# Patient Record
Sex: FEMALE | Race: BLACK OR AFRICAN AMERICAN | Employment: OTHER | URBAN - METROPOLITAN AREA
[De-identification: names, ages, dates, MRNs, and addresses within clinical notes are randomized per-mention and may not be internally consistent; named-entity substitution may affect disease eponyms.]

---

## 2017-01-30 LAB — CREATININE, EXTERNAL: 0.9

## 2017-01-31 LAB
HBA1C MFR BLD HPLC: 10.1 %
LDL-C, EXTERNAL: 193

## 2017-03-04 DIAGNOSIS — E11.65 TYPE 2 DIABETES MELLITUS WITH HYPERGLYCEMIA (HCC): ICD-10-CM

## 2017-03-05 RX ORDER — BLOOD SUGAR DIAGNOSTIC
STRIP MISCELLANEOUS
Qty: 200 STRIP | Refills: 5 | Status: SHIPPED | OUTPATIENT
Start: 2017-03-05 | End: 2017-03-07 | Stop reason: SDUPTHER

## 2017-03-13 ENCOUNTER — OFFICE VISIT (OUTPATIENT)
Dept: ENDOCRINOLOGY | Age: 67
End: 2017-03-13

## 2017-03-13 VITALS
HEART RATE: 95 BPM | RESPIRATION RATE: 18 BRPM | SYSTOLIC BLOOD PRESSURE: 151 MMHG | TEMPERATURE: 96.6 F | BODY MASS INDEX: 41.79 KG/M2 | WEIGHT: 250.8 LBS | HEIGHT: 65 IN | DIASTOLIC BLOOD PRESSURE: 82 MMHG

## 2017-03-13 DIAGNOSIS — E78.2 MIXED HYPERLIPIDEMIA: ICD-10-CM

## 2017-03-13 DIAGNOSIS — I10 ESSENTIAL HYPERTENSION: ICD-10-CM

## 2017-03-13 DIAGNOSIS — E11.65 TYPE 2 DIABETES MELLITUS WITH HYPERGLYCEMIA, WITH LONG-TERM CURRENT USE OF INSULIN (HCC): Primary | ICD-10-CM

## 2017-03-13 DIAGNOSIS — Z79.4 TYPE 2 DIABETES MELLITUS WITH HYPERGLYCEMIA, WITH LONG-TERM CURRENT USE OF INSULIN (HCC): ICD-10-CM

## 2017-03-13 DIAGNOSIS — Z79.4 TYPE 2 DIABETES MELLITUS WITH HYPERGLYCEMIA, WITH LONG-TERM CURRENT USE OF INSULIN (HCC): Primary | ICD-10-CM

## 2017-03-13 DIAGNOSIS — E11.65 TYPE 2 DIABETES MELLITUS WITH HYPERGLYCEMIA, WITH LONG-TERM CURRENT USE OF INSULIN (HCC): ICD-10-CM

## 2017-03-13 NOTE — PROGRESS NOTES
Leonila Lance is a 61 y.o. female here for   Chief Complaint   Patient presents with    Diabetes    Cholesterol Problem    Hypertension       Functional glucose monitor and record keeping system? - yes  Eye exam within last year? - yes Mar 2017  Foot exam within last year? - yes dec 2016    Lab Results   Component Value Date/Time    Hemoglobin A1c 8.9 10/12/2015 10:30 AM    Hemoglobin A1c (POC) 9.0 03/11/2015 10:55 AM    Hemoglobin A1c, External 9.8 10/01/2016       Wt Readings from Last 3 Encounters:   11/03/16 256 lb 6.4 oz (116.3 kg)   10/12/15 235 lb (106.6 kg)   10/05/15 230 lb (104.3 kg)     Temp Readings from Last 3 Encounters:   11/03/16 97.4 °F (36.3 °C) (Oral)   10/12/15 97.4 °F (36.3 °C)   08/28/15 98.6 °F (37 °C)     BP Readings from Last 3 Encounters:   11/03/16 147/82   10/12/15 168/80   08/28/15 (!) 156/93     Pulse Readings from Last 3 Encounters:   11/03/16 97   10/12/15 87   08/28/15 (!) 105

## 2017-03-13 NOTE — PATIENT INSTRUCTIONS
Lantus solostar 70 units AM and 50 units 7 PM     Novolog fast acting 35 mg units breakfast ,45 units  lunch and 45 units  dinner , take 15 mins after you eat   Less than 70          Take half the dose       Additional Novolog if     Blood sugar  Breakfast/Lunch/Dinner       130-200  Add  5  Units       201-250  Add  10 Units       251-300  Add  15 Units       301-350  Add 18 Units        351-400  Add 20 Units       Check glucose before breakfast and dinner,bedtime - 3 times a day

## 2017-03-13 NOTE — MR AVS SNAPSHOT
Visit Information Date & Time Provider Department Dept. Phone Encounter #  
 3/13/2017 10:45 AM Darlene Garcia MD [facility] [phone] [id number] Follow-up Instructions Return in about 3 months (around 6/13/2017). Upcoming Health Maintenance Date Due Hepatitis C Screening 1950 FOOT EXAM Q1 7/1/1962 EYE EXAM RETINAL OR DILATED Q1 7/1/1962 Pneumococcal 19-64 Medium Risk (1 of 1 - PPSV23) 7/1/1971 DTaP/Tdap/Td series (1 - Tdap) 7/1/1973 PAP AKA CERVICAL CYTOLOGY 7/1/1973 BREAST CANCER SCRN MAMMOGRAM 7/1/2002 FOBT Q 1 YEAR AGE 50-75 7/1/2002 ZOSTER VACCINE AGE 60> 7/1/2012 INFLUENZA AGE 9 TO ADULT 8/1/2016 HEMOGLOBIN A1C Q6M 4/1/2017 MICROALBUMIN Q1 10/1/2017 LIPID PANEL Q1 10/1/2017 Allergies as of 3/13/2017  Review Complete On: 3/13/2017 By: Darlene Garcia MD  
  
 Severity Noted Reaction Type Reactions Motrin [Ibuprofen]  02/28/2013    Other (comments) Peptic ulcer Peanut  02/28/2013    Hives Sulfa (Sulfonamide Antibiotics)  04/12/2013    Rash Zithromax [Azithromycin]  02/28/2013    Hives Hives and rash Current Immunizations  Reviewed on 10/9/2015 No immunizations on file. Not reviewed this visit You Were Diagnosed With   
  
 Codes Comments Type 2 diabetes mellitus with hyperglycemia, with long-term current use of insulin (HCC)    -  Primary ICD-10-CM: E11.65, Z79.4 ICD-9-CM: 250.00, 790.29, V58.67 Mixed hyperlipidemia     ICD-10-CM: E78.2 ICD-9-CM: 272.2 Essential hypertension     ICD-10-CM: I10 
ICD-9-CM: 401.9 Vitals BP Pulse Temp Resp Height(growth percentile) Weight(growth percentile) 151/82 (BP 1 Location: Right arm, BP Patient Position: Sitting) 95 96.6 °F (35.9 °C) (Oral) 18 5' 4.75\" (1.645 m) 250 lb 12.8 oz (113.8 kg) BMI OB Status Smoking Status 42.06 kg/m2 Hysterectomy Former Smoker BMI and BSA Data Body Mass Index Body Surface Area 42.06 kg/m 2 2.28 m 2 Preferred Pharmacy Pharmacy Name Phone [facility] [address] [facility] [place], [place] Your Updated Medication List  
  
   
This list is accurate as of: 3/13/17 11:45 AM.  Always use your most recent med list.  
  
  
  
  
 aspirin 81 mg tablet Take 81 mg by mouth daily. atorvastatin 40 mg tablet Commonly known as:  LIPITOR  
  
 BD INSULIN PEN NEEDLE UF SHORT 31 gauge x 5/16\" Ndle Generic drug:  Insulin Needles (Disposable) USE TO INJECT INSULIN UP TO 6 TIMES A DAY  
  
 BREO ELLIPTA 200-25 mcg/dose inhaler Generic drug:  fluticasone-vilanterol  
  
 gabapentin 300 mg capsule Commonly known as:  NEURONTIN Take 300 mg by mouth two (2) times daily as needed. glucose blood VI test strips strip Commonly known as:  ACCU-CHEK SMARTVIEW TEST STRIP  
USE  STRIP TO TEST BLOOD GLUCOSE 4 TIMES DAILY Dx Code: E11.65  
  
 insulin aspart 100 unit/mL Inpn Commonly known as:  Claybon Delay Inject 35 units breakfast ,35 units before lunch and 35 units before dinner w/ SSI Max units daily: 165  
  
 insulin glargine 100 unit/mL (3 mL) pen Commonly known as:  LANTUS SOLOSTAR Inject 60 units AM and 60 units 7 PM  
  
 * Lancets Misc Commonly known as:  ACCU-CHEK SOFTCLIX LANCETS Use to test blood glucose 4x daily, Uncontrolled DM and frequently on steroids * Lancets Misc Commonly known as:  ACCU-CHEK FASTCLIX Use to test blood glucose 4x daily Dx Code: E11.65  
  
 LASIX 80 mg tablet Generic drug:  furosemide Take  by mouth daily. loratadine 10 mg tablet Commonly known as:  Reyes long term Take 10 mg by mouth daily. LORazepam 0.5 mg tablet Commonly known as:  ATIVAN Take 0.5 mg by mouth every eight (8) hours as needed for Anxiety. losartan 25 mg tablet Commonly known as:  COZAAR  
  
 metFORMIN 1,000 mg tablet Commonly known as:  GLUCOPHAGE  
 Take 1 Tab by mouth two (2) times daily (with meals). metoprolol succinate 25 mg XL tablet Commonly known as:  TOPROL-XL Take 1 Tab by mouth daily. nystatin 100,000 unit/mL suspension Commonly known as:  MYCOSTATIN  
  
 oxyCODONE IR 5 mg immediate release tablet Commonly known as:  Lyndamelanie Peteen Take 1-2 Tabs by mouth every three (3) hours as needed. Max Daily Amount: 80 mg. VENTOLIN HFA 90 mcg/actuation inhaler Generic drug:  albuterol Take 1 Puff by inhalation daily. VITAMIN B-12 PO Take 500 mcg by mouth daily. VITAMIN D3 1,000 unit Cap Generic drug:  cholecalciferol Take 1 Tab by mouth daily. VOLTAREN 1 % Gel Generic drug:  diclofenac * Notice: This list has 2 medication(s) that are the same as other medications prescribed for you. Read the directions carefully, and ask your doctor or other care provider to review them with you. Follow-up Instructions Return in about 3 months (around 6/13/2017). Patient Instructions Lantus solostar 70 units AM and 50 units 7 PM  
 
Novolog fast acting 35 mg units breakfast ,45 units  lunch and 45 units  dinner , take 15 mins after you eat Lantus solostar 60 units AM and 60 units 7 PM  
Less than 70          Take half the dose Additional Novolog if  
 
Blood sugar  Breakfast/Lunch/Dinner 130-200  Add  5  Units 201-250  Add  10 Units 251-300  Add  15 Units 301-350  Add 18 Units 351-400  Add 20 Units Check glucose before breakfast and dinner,bedtime - 3 times a day Introducing [facility]! Dear Patricia Teague: 
Thank you for requesting a [employer] account. Our records indicate that you already have an active [employer] account. You can access your account anytime at [email or web address] Did you know that you can access your hospital and ER discharge instructions at any time in [employer]?   You can also review all of your test results from your hospital stay or ER visit. Additional Information If you have questions, please visit the Frequently Asked Questions section of the [email or web address] website at [email or web address] Remember, [email or web address] is NOT to be used for urgent needs. For medical emergencies, dial 911. Now available from your iPhone and Android! Please provide this summary of care documentation to your next provider. Your primary care clinician is listed as [facility]. If you have any questions after today's visit, please call [phone].

## 2017-03-14 RX ORDER — INSULIN GLARGINE 100 [IU]/ML
INJECTION, SOLUTION SUBCUTANEOUS
Qty: 45 ML | Refills: 11 | Status: SHIPPED | OUTPATIENT
Start: 2017-03-14

## 2017-03-14 RX ORDER — INSULIN ASPART 100 [IU]/ML
INJECTION, SOLUTION INTRAVENOUS; SUBCUTANEOUS
Qty: 60 ML | Refills: 11 | Status: SHIPPED | OUTPATIENT
Start: 2017-03-14 | End: 2017-06-19 | Stop reason: SDUPTHER

## 2017-03-14 RX ORDER — METFORMIN HYDROCHLORIDE 1000 MG/1
1000 TABLET ORAL 2 TIMES DAILY WITH MEALS
Qty: 60 TAB | Refills: 11 | Status: SHIPPED | OUTPATIENT
Start: 2017-03-14

## 2017-03-14 RX ORDER — LANCETS
EACH MISCELLANEOUS
Qty: 200 EACH | Refills: 5 | Status: SHIPPED | OUTPATIENT
Start: 2017-03-14

## 2017-06-19 DIAGNOSIS — Z79.4 TYPE 2 DIABETES MELLITUS WITH HYPERGLYCEMIA, WITH LONG-TERM CURRENT USE OF INSULIN (HCC): ICD-10-CM

## 2017-06-19 DIAGNOSIS — E11.65 TYPE 2 DIABETES MELLITUS WITH HYPERGLYCEMIA, WITH LONG-TERM CURRENT USE OF INSULIN (HCC): ICD-10-CM

## 2017-06-19 RX ORDER — INSULIN ASPART 100 [IU]/ML
INJECTION, SOLUTION INTRAVENOUS; SUBCUTANEOUS
Qty: 165 ML | Refills: 3 | Status: SHIPPED | OUTPATIENT
Start: 2017-06-19

## 2017-11-14 LAB
CREATININE, EXTERNAL: 0.63
HBA1C MFR BLD HPLC: NORMAL %

## 2017-11-20 DIAGNOSIS — E11.9 TYPE 2 DIABETES MELLITUS WITHOUT COMPLICATION (HCC): ICD-10-CM

## 2017-11-20 RX ORDER — PEN NEEDLE, DIABETIC 31 GX5/16"
NEEDLE, DISPOSABLE MISCELLANEOUS
Qty: 200 PEN NEEDLE | Refills: 11 | Status: SHIPPED | OUTPATIENT
Start: 2017-11-20

## 2018-06-20 NOTE — PROGRESS NOTES
Health Maintenance Summary     Topic Due On Due Status Completed On Postpone Until Reason     Jul 11, 1994 Postponed  Jun 20, 2018 Patient Refused    IMMUNIZATION - DTaP/Tdap/Td Dec 15, 2027 Not Due Dec 15, 2017      Immunization-Influenza  Completed Dec 15, 2017      Depression Screening Dec 15, 2018 Not Due Dec 15, 2017            Patient is due for topics as listed above, he wishes to decline at this time .           [facility]               Terri Cohen MD        [address] 78 [phone] Fax [phone] ( [id number]) [address] [address] [phone] Fax [phone] ( Monday)          Patient Information  Date:3/13/2017  Name : Mortimer Elders 61 y.o.     YOB: 1950         Referred by:  Dr Pino Posada.    History of Present Illness: Mortimer Elders is a 61 y.o. female here for fu  of  Type 2 Diabetes Mellitus   Back pain ,seeing pain management , DJD < s/p Steroid injections      Last Tuesday she got steroid injection to the back and BG is high   She is missing AM lantus  Lately BG is in 300 - 400s post steroid injection and gets steroids frequently     Could not tolerate  Bydureon,     She reports mold in the house and then she is living with friends and family. She has been traveling a lot. She is not using CPAP consistently.       Intermittently on steroids since December 2013   Monitoring frequency:2-3 /day           Wt Readings from Last 3 Encounters:   03/13/17 250 lb 12.8 oz (113.8 kg)   11/03/16 256 lb 6.4 oz (116.3 kg)   10/12/15 235 lb (106.6 kg)       BP Readings from Last 3 Encounters:   03/13/17 151/82   11/03/16 147/82   10/12/15 168/80         Past Medical History:   Diagnosis Date    Arrhythmia     palpitations    Arthritis     Asthma     Chronic pain     lower back    COPD (chronic obstructive pulmonary disease) ([address]     Coronary-myocardial bridge     LAD non obstructive by cath 2009    DM type 2 (diabetes mellitus, type 2) ([address]     Essential hypertension     HCVD (hypertensive cardiovascular disease)     LVH     Heart failure ([address]     chf    Hyperlipidemia     Migraines     Morbid obesity ([address]     Stroke ([facility] 2009    Tic     Unspecified sleep apnea     does not tolerate cpap     Current Outpatient Prescriptions   Medication Sig    glucose blood VI test strips (ACCU-CHEK SMARTVIEW TEST STRIP) strip USE  STRIP TO TEST BLOOD GLUCOSE 4 TIMES DAILY Dx Code: E11.65    VOLTAREN 1 % gel     BREO ELLIPTA 200-25 mcg/dose inhaler     nystatin (MYCOSTATIN) 100,000 unit/mL suspension     BD INSULIN PEN NEEDLE UF SHORT 31 gauge x 5/16\" ndle USE TO INJECT INSULIN UP TO 6 TIMES A DAY    Lancets (ACCU-CHEK FASTCLIX) misc Use to test blood glucose 4x daily Dx Code: E11.65    oxyCODONE IR (ROXICODONE) 5 mg immediate release tablet Take 1-2 Tabs by mouth every three (3) hours as needed. Max Daily Amount: 80 mg.    loratadine (CLARITIN) 10 mg tablet Take 10 mg by mouth daily.  Cholecalciferol, Vitamin D3, (VITAMIN D3) 1,000 unit cap Take 1 Tab by mouth daily.  insulin glargine (LANTUS SOLOSTAR) 100 unit/mL (3 mL) pen Inject 60 units AM and 60 units 7 PM    insulin aspart (NOVOLOG) 100 unit/mL flexpen Inject 35 units breakfast ,35 units before lunch and 35 units before dinner w/ SSI Max units daily: 165    metFORMIN (GLUCOPHAGE) 1,000 mg tablet Take 1 Tab by mouth two (2) times daily (with meals).  LORazepam (ATIVAN) 0.5 mg tablet Take 0.5 mg by mouth every eight (8) hours as needed for Anxiety.  CYANOCOBALAMIN, VITAMIN B-12, (VITAMIN B-12 PO) Take 500 mcg by mouth daily.  gabapentin (NEURONTIN) 300 mg capsule Take 300 mg by mouth two (2) times daily as needed.  albuterol (VENTOLIN HFA) 90 mcg/actuation inhaler Take 1 Puff by inhalation daily.  metoprolol succinate (TOPROL-XL) 25 mg XL tablet Take 1 Tab by mouth daily.  Lancets (ACCU-CHEK SOFTCLIX LANCETS) misc Use to test blood glucose 4x daily, Uncontrolled DM and frequently on steroids    furosemide (LASIX) 80 mg tablet Take  by mouth daily.  aspirin 81 mg tablet Take 81 mg by mouth daily.  atorvastatin (LIPITOR) 40 mg tablet     losartan (COZAAR) 25 mg tablet      No current facility-administered medications for this visit.       Allergies   Allergen Reactions    Motrin [Ibuprofen] Other (comments)     Peptic ulcer    Peanut Hives    Sulfa (Sulfonamide Antibiotics) Rash    Zithromax [Azithromycin] Hives     Hives and rash         Review of Systems:  - Constitutional Symptoms: no fevers, no chills  - Eyes: no blurry vision no double vision  - Cardiovascular: no chest pain ,no palpitations  - Respiratory: no cough , shortness of breath  - Gastrointestinal: no dysphagia no  abdominal pain  - Musculoskeletal: no joint pains +  weakness  - Integumentary: no rashes  - Neurological: no numbness, tingling, no  headaches  -     Physical Examination:   Blood pressure 151/82, pulse 95, temperature 96.6 °F (35.9 °C), temperature source Oral, resp. rate 18, height 5' 4.75\" (1.645 m), weight 250 lb 12.8 oz (113.8 kg). Estimated body mass index is 42.06 kg/(m^2) as calculated from the following:    Height as of this encounter: 5' 4.75\" (1.645 m). -   Weight as of this encounter: 250 lb 12.8 oz (113.8 kg). - General: pleasant, no distress, good eye contact,obese  - HEENT: no pallor, no periorbital edema, EOMI  - Neck: supple, no thyromegaly  - Cardiovascular: regular, normal rate, normal S1 and S2  - Respiratory: clear to auscultation bilaterally  - Gastrointestinal: soft, nontender, nondistended,  BS +  - Musculoskeletal: trace edema  - Neurological:alert and oriented  - Psychiatric: normal mood and affect  - Skin: color, texture, turgor normal.       Data Reviewed:     [x] Glucose records reviewed. [x] See glucose records for details (to be scanned).   [x] A1C  [x] Reviewed labs      Lab Results   Component Value Date/Time    WBC 8.8 10/08/2015 11:35 PM    HGB 9.9 10/08/2015 11:35 PM    HCT 31.3 10/08/2015 11:35 PM    PLATELET 598 26/10/3938 11:35 PM    MCV 79.2 10/08/2015 11:35 PM     Lab Results   Component Value Date/Time    Hemoglobin A1c 8.9 10/12/2015 10:30 AM    Hemoglobin A1c 9.0 08/11/2015 08:30 AM    Hemoglobin A1c 9.1 06/03/2015 10:56 AM    Microalb/Creat ratio (ug/mg creat.) 4.6 06/03/2015 10:56 AM    LDL,Direct 180 06/03/2015 10:56 AM    LDL, calculated 170 04/25/2014 09:32 AM    Creatinine 0.74 10/12/2015 05:30 AM    Hemoglobin A1c, External 9.8 10/01/2016      Lab Results   Component Value Date/Time    Cholesterol, total 263 04/25/2014 09:32 AM    HDL Cholesterol 75 04/25/2014 09:32 AM    LDL,Direct 180 06/03/2015 10:56 AM    LDL, calculated 170 04/25/2014 09:32 AM    Triglyceride 91 04/25/2014 09:32 AM     Lab Results   Component Value Date/Time    AST (SGOT) 25 10/08/2015 11:35 PM    Alk. phosphatase 142 10/08/2015 11:35 PM     Lab Results   Component Value Date/Time    GFR est AA >60 10/12/2015 05:30 AM    GFR est non-AA >60 10/12/2015 05:30 AM    Creatinine 0.74 10/12/2015 05:30 AM    BUN 3 10/12/2015 05:30 AM    Sodium 144 10/12/2015 05:30 AM    Potassium 3.8 10/12/2015 05:30 AM    Chloride 107 10/12/2015 05:30 AM    CO2 28 10/12/2015 05:30 AM      Lab Results   Component Value Date/Time    TSH 1.630 04/16/2013 10:32 AM    TSH 2.02 09/29/2009 01:00 PM          TSH 4/13 nl    Lab Results   Component Value Date/Time    Hemoglobin A1c 8.9 10/12/2015 10:30 AM    Hemoglobin A1c (POC) 9.0 03/11/2015 10:55 AM    Hemoglobin A1c, External 9.8 10/01/2016     A1C 8.6 11/14     Assessment/Plan:     1. Type 2 diabetes mellitus with hyperglycemia, with long-term current use of insulin ([address]    2. Mixed hyperlipidemia    3. Essential hypertension        1. Type 2 Diabetes Mellitus,uncontrolled  Lab Results   Component Value Date/Time    Hemoglobin A1c 8.9 10/12/2015 10:30 AM    Hemoglobin A1c (POC) 9.0 03/11/2015 10:55 AM    Hemoglobin A1c, External 9.8 10/01/2016       No improvement in the glycemic control,blood glucose is fluctuating widely   Intermittently on steroids  Lantus solostar 70 units AM and 50 units 7 PM   Novolog 15 mins after she eats   Compliance discussed  Discussed the importance of checking home glucose regularly and taking all of their scheduled medications in order to have the best possible outcome. Reviewed the complications and importance of diet. Metformin. Stop Bydureon - could not tolerate this time , did very well when she was on it 2 years ago , could be having gastroparesis     . Focused the importance of checking the blood sugars and sending it to the office for further titration and better control. FLU annually ,Pneumovax ,aspirin daily,annual eye exam,microalbumin     2. HTN : Continue current therapy     3. Hyperlipidemia :she stopped atorvastatin secondary myalgia, failed pravastatin -lowest dose, every other day ,insurance did not cover Zetia 10 mg     4. Obesity: steroids worsening  GLP1    5 DANNY - CPAP compliance       Thank you for allowing me to participate in the care of this patient.     Andrea Knapp MD      Patient Instructions   Lantus solostar 70 units AM and 50 units 7 PM     Novolog fast acting 35 mg units breakfast ,45 units  lunch and 45 units  dinner , take 15 mins after you eat   Less than 70          Take half the dose       Additional Novolog if     Blood sugar  Breakfast/Lunch/Dinner       130-200  Add  5  Units       201-250  Add  10 Units       251-300  Add  15 Units       301-350  Add 18 Units        351-400  Add 20 Units       Check glucose before breakfast and dinner,bedtime - 3 times a day

## 2018-09-12 ENCOUNTER — APPOINTMENT (OUTPATIENT)
Dept: CT IMAGING | Age: 68
End: 2018-09-12
Attending: PHYSICIAN ASSISTANT
Payer: MEDICARE

## 2018-09-12 ENCOUNTER — HOSPITAL ENCOUNTER (EMERGENCY)
Age: 68
Discharge: HOME | End: 2018-09-12
Attending: EMERGENCY MEDICINE
Payer: MEDICARE

## 2018-09-12 VITALS
RESPIRATION RATE: 18 BRPM | HEART RATE: 76 BPM | OXYGEN SATURATION: 100 % | DIASTOLIC BLOOD PRESSURE: 66 MMHG | TEMPERATURE: 98.1 F | SYSTOLIC BLOOD PRESSURE: 128 MMHG

## 2018-09-12 DIAGNOSIS — R51.9 ACUTE NONINTRACTABLE HEADACHE, UNSPECIFIED HEADACHE TYPE: Primary | ICD-10-CM

## 2018-09-12 DIAGNOSIS — I10 ESSENTIAL HYPERTENSION: ICD-10-CM

## 2018-09-12 LAB
ALBUMIN SERPL-MCNC: 3.3 G/DL (ref 3.5–5)
ALBUMIN/GLOB SERPL: 0.7 {RATIO} (ref 1.1–2.2)
ALP SERPL-CCNC: 168 U/L (ref 45–117)
ALT SERPL-CCNC: 23 U/L (ref 12–78)
ANION GAP SERPL CALC-SCNC: 8 MMOL/L (ref 5–15)
APPEARANCE UR: ABNORMAL
AST SERPL-CCNC: 22 U/L (ref 15–37)
ATRIAL RATE: 80 BPM
BACTERIA URNS QL MICRO: NEGATIVE /HPF
BASOPHILS # BLD: 0.1 K/UL (ref 0–0.1)
BASOPHILS NFR BLD: 1 % (ref 0–1)
BILIRUB SERPL-MCNC: 0.2 MG/DL (ref 0.2–1)
BILIRUB UR QL: NEGATIVE
BUN SERPL-MCNC: 9 MG/DL (ref 6–20)
BUN/CREAT SERPL: 11 (ref 12–20)
CALCIUM SERPL-MCNC: 9.6 MG/DL (ref 8.5–10.1)
CALCULATED P AXIS, ECG09: 56 DEGREES
CALCULATED R AXIS, ECG10: -38 DEGREES
CALCULATED T AXIS, ECG11: 9 DEGREES
CHLORIDE SERPL-SCNC: 103 MMOL/L (ref 97–108)
CO2 SERPL-SCNC: 30 MMOL/L (ref 21–32)
COLOR UR: ABNORMAL
COMMENT, HOLDF: NORMAL
CREAT SERPL-MCNC: 0.79 MG/DL (ref 0.55–1.02)
DIAGNOSIS, 93000: NORMAL
DIFFERENTIAL METHOD BLD: ABNORMAL
EOSINOPHIL # BLD: 0.8 K/UL (ref 0–0.4)
EOSINOPHIL NFR BLD: 7 % (ref 0–7)
EPITH CASTS URNS QL MICRO: ABNORMAL /LPF
ERYTHROCYTE [DISTWIDTH] IN BLOOD BY AUTOMATED COUNT: 14.3 % (ref 11.5–14.5)
GLOBULIN SER CALC-MCNC: 4.6 G/DL (ref 2–4)
GLUCOSE SERPL-MCNC: 196 MG/DL (ref 65–100)
GLUCOSE UR STRIP.AUTO-MCNC: NEGATIVE MG/DL
HCT VFR BLD AUTO: 37.1 % (ref 35–47)
HGB BLD-MCNC: 11.1 G/DL (ref 11.5–16)
HGB UR QL STRIP: NEGATIVE
HYALINE CASTS URNS QL MICRO: ABNORMAL /LPF (ref 0–5)
IMM GRANULOCYTES # BLD: 0 K/UL (ref 0–0.04)
IMM GRANULOCYTES NFR BLD AUTO: 0 % (ref 0–0.5)
KETONES UR QL STRIP.AUTO: NEGATIVE MG/DL
LEUKOCYTE ESTERASE UR QL STRIP.AUTO: NEGATIVE
LYMPHOCYTES # BLD: 3.1 K/UL (ref 0.8–3.5)
LYMPHOCYTES NFR BLD: 28 % (ref 12–49)
MCH RBC QN AUTO: 24.6 PG (ref 26–34)
MCHC RBC AUTO-ENTMCNC: 29.9 G/DL (ref 30–36.5)
MCV RBC AUTO: 82.1 FL (ref 80–99)
MONOCYTES # BLD: 0.5 K/UL (ref 0–1)
MONOCYTES NFR BLD: 5 % (ref 5–13)
NEUTS SEG # BLD: 6.4 K/UL (ref 1.8–8)
NEUTS SEG NFR BLD: 59 % (ref 32–75)
NITRITE UR QL STRIP.AUTO: NEGATIVE
NRBC # BLD: 0 K/UL (ref 0–0.01)
NRBC BLD-RTO: 0 PER 100 WBC
P-R INTERVAL, ECG05: 158 MS
PH UR STRIP: 7 [PH] (ref 5–8)
PLATELET # BLD AUTO: 336 K/UL (ref 150–400)
PMV BLD AUTO: 9.5 FL (ref 8.9–12.9)
POTASSIUM SERPL-SCNC: 4.3 MMOL/L (ref 3.5–5.1)
PROT SERPL-MCNC: 7.9 G/DL (ref 6.4–8.2)
PROT UR STRIP-MCNC: NEGATIVE MG/DL
Q-T INTERVAL, ECG07: 376 MS
QRS DURATION, ECG06: 98 MS
QTC CALCULATION (BEZET), ECG08: 433 MS
RBC # BLD AUTO: 4.52 M/UL (ref 3.8–5.2)
RBC #/AREA URNS HPF: ABNORMAL /HPF (ref 0–5)
SAMPLES BEING HELD,HOLD: NORMAL
SODIUM SERPL-SCNC: 141 MMOL/L (ref 136–145)
SP GR UR REFRACTOMETRY: 1.01 (ref 1–1.03)
TROPONIN I SERPL-MCNC: <0.05 NG/ML
UR CULT HOLD, URHOLD: NORMAL
UROBILINOGEN UR QL STRIP.AUTO: 1 EU/DL (ref 0.2–1)
VENTRICULAR RATE, ECG03: 80 BPM
WBC # BLD AUTO: 10.8 K/UL (ref 3.6–11)
WBC URNS QL MICRO: ABNORMAL /HPF (ref 0–4)

## 2018-09-12 PROCEDURE — 84484 ASSAY OF TROPONIN QUANT: CPT | Performed by: PHYSICIAN ASSISTANT

## 2018-09-12 PROCEDURE — 36415 COLL VENOUS BLD VENIPUNCTURE: CPT | Performed by: PHYSICIAN ASSISTANT

## 2018-09-12 PROCEDURE — 80053 COMPREHEN METABOLIC PANEL: CPT | Performed by: PHYSICIAN ASSISTANT

## 2018-09-12 PROCEDURE — 81001 URINALYSIS AUTO W/SCOPE: CPT | Performed by: PHYSICIAN ASSISTANT

## 2018-09-12 PROCEDURE — 99285 EMERGENCY DEPT VISIT HI MDM: CPT

## 2018-09-12 PROCEDURE — 85025 COMPLETE CBC W/AUTO DIFF WBC: CPT | Performed by: PHYSICIAN ASSISTANT

## 2018-09-12 PROCEDURE — 70450 CT HEAD/BRAIN W/O DYE: CPT

## 2018-09-12 PROCEDURE — 74011250637 HC RX REV CODE- 250/637: Performed by: EMERGENCY MEDICINE

## 2018-09-12 PROCEDURE — 93005 ELECTROCARDIOGRAM TRACING: CPT

## 2018-09-12 RX ORDER — FENTANYL CITRATE 50 UG/ML
50 INJECTION, SOLUTION INTRAMUSCULAR; INTRAVENOUS
Status: DISCONTINUED | OUTPATIENT
Start: 2018-09-12 | End: 2018-09-12

## 2018-09-12 RX ORDER — ACETAMINOPHEN 500 MG
1000 TABLET ORAL
Status: COMPLETED | OUTPATIENT
Start: 2018-09-12 | End: 2018-09-12

## 2018-09-12 RX ADMIN — ACETAMINOPHEN 1000 MG: 500 TABLET ORAL at 16:01

## 2018-09-12 NOTE — DISCHARGE INSTRUCTIONS

## 2018-09-12 NOTE — ED TRIAGE NOTES
Patient with right sided headache, and elevated blood pressure. Brought in by EMS. States BP was high when they arrived, but patient was anxious. It has decreased at this time

## 2018-09-12 NOTE — ED PROVIDER NOTES
HPI Comments: 79 y.o. female with extensive past medical history, please see list, significant for HCVD, stroke, asthma, heart failure, COPD, DM, HTN, hyperlipidemia, coronary-myocardial bridge, migraines, sleep apnea, and arrhythmia who presents to the ED via EMS with chief complaint of headache. Pt reports this morning she started with a right sided headache accompanied by chest tightness, nausea, and \"feeling hot and cold. \" Pt states her physical therapist said she \"didn't look right\" and she was diaphoretic and flushed. Pt states her BP was also elevated at the time. Pt states her sx are now resolved except for her headache. Pt states she took Aleve this morning with some relief. There are no other acute medical complaints voiced at this time. Social Hx: Former smoker. Denies EtOH or drug use. PCP: Geena Abbott MD 
 
Note written by Lon Buckner, as dictated by George Lake MD 2:26 PM  
 
 
 
The history is provided by the patient. Past Medical History:  
Diagnosis Date  Arrhythmia   
 palpitations  Arthritis  Asthma  Chronic pain   
 lower back  COPD (chronic obstructive pulmonary disease) (HCC)  Coronary-myocardial bridge LAD non obstructive by cath 2009  DM type 2 (diabetes mellitus, type 2) ([facility]  Essential hypertension  HCVD (hypertensive cardiovascular disease) LVH   
 Heart failure ([facility] chf  
 Hyperlipidemia  Migraines  Morbid obesity ([facility]  Stroke [facility]) 2009  Tic  Unspecified sleep apnea   
 does not tolerate cpap Past Surgical History:  
Procedure Laterality Date  HX APPENDECTOMY  HX BACK SURGERY  1990  
 HX BACK SURGERY  08/2015  HX CHOLECYSTECTOMY  HX HIP REPLACEMENT Bilateral   
 HX HYSTERECTOMY  HX ORTHOPAEDIC    
 bilat wrist tendon release  HX TONSIL AND ADENOIDECTOMY  HX TONSILLECTOMY Family History:  
Problem Relation Age of Onset  Cancer Mother  Heart Disease Mother  Hypertension Mother  Cancer Father  Diabetes Father  Hypertension Father  Cancer Sister  Diabetes Sister  Heart Disease Sister  Hypertension Sister  Diabetes Brother  Heart Disease Brother  Hypertension Brother  Anesth Problems Neg Hx Social History Social History  Marital status:  Spouse name: N/A  
 Number of children: N/A  
 Years of education: N/A Occupational History  Not on file. Social History Main Topics  Smoking status: Former Smoker Packs/day: 0.50 Years: 15.00 Quit date: 1/1/1999  Smokeless tobacco: Never Used  Alcohol use No  
 Drug use: No  
 Sexual activity: No  
 
Other Topics Concern  Not on file Social History Narrative ALLERGIES: Motrin [ibuprofen]; Peanut; Sulfa (sulfonamide antibiotics); and Zithromax [azithromycin] Review of Systems Constitutional: Positive for diaphoresis. Negative for fever. Respiratory: Positive for chest tightness. Negative for shortness of breath. Cardiovascular: Negative for chest pain and leg swelling. Gastrointestinal: Positive for nausea. Negative for vomiting. Musculoskeletal: Negative for back pain and neck pain. Neurological: Positive for headaches. All other systems reviewed and are negative. Vitals:  
 09/12/18 1344 09/12/18 1345 BP: 159/80 Pulse: 80 81 Resp: 18 Temp: 98.1 °F (36.7 °C) SpO2:  100% Physical Exam  
Constitutional: She is oriented to person, place, and time. She appears well-developed and well-nourished. No distress. Well appearing. HENT:  
Head: Normocephalic and atraumatic. Eyes: Conjunctivae are normal.  
Neck: Neck supple. Cardiovascular: Normal rate, regular rhythm and normal heart sounds. Pulmonary/Chest: Effort normal and breath sounds normal. No stridor. No respiratory distress. Abdominal: Soft. She exhibits no distension. There is no tenderness. Musculoskeletal: Normal range of motion. Neurological: She is alert and oriented to person, place, and time. No cranial nerve deficit. Coordination normal.  
No cranial nerve deficits. Alert and oriented x 3. Skin: Skin is warm and dry. Psychiatric: She has a normal mood and affect. Nursing note and vitals reviewed. Note written by Lon Darby, as dictated by Brandon Dacosta MD 2:27 PM 
 
MDM 
 
79 y.o. female presents with headache and noted hypertension which was worse than normal. She had some chills at that time and was sent for evaluation. She is improved with persistent headache here which resolved with tylenol. Extensive workup here including neuroimaging is without abnormalities or emergent pathology requiring admission and she is back to baseline. Plan to follow up with PCP as needed and return precautions discussed for worsening or new concerning symptoms. ED Course Procedures ED EKG interpretation: 
Rhythm: normal sinus rhythm; and regular . Rate (approx.): 80; Axis: left axis deviation; ST/T wave: normal.  
 
Note written by Lon Darby, as dictated by Brandon Dacosta MD 2:46 PM  
 
PROGRESS NOTE: 
5:16 PM  
Pt is feeling better. Pt's daughter had several questions which were answered. Vital Signs Patient Vitals for the past 12 hrs: 
 Temp Pulse Resp BP SpO2  
09/12/18 1800 - 76 18 128/66 100 % 09/12/18 1730 - 65 13 141/72 99 % 09/12/18 1545 - 73 11 155/81 99 % 09/12/18 1515 - 84 16 154/75 100 % 09/12/18 1500 - 76 11 151/76 100 % 09/12/18 1345 - 81 - - 100 % 09/12/18 1344 98.1 °F (36.7 °C) 80 18 159/80 - Lab Results Reviewed Recent Results (from the past 24 hour(s)) EKG, 12 LEAD, INITIAL Collection Time: 09/12/18  2:03 PM  
Result Value Ref Range Ventricular Rate 80 BPM  
 Atrial Rate 80 BPM  
 P-R Interval 158 ms QRS Duration 98 ms Q-T Interval 376 ms QTC Calculation (Bezet) 433 ms Calculated P Axis 56 degrees Calculated R Axis -38 degrees Calculated T Axis 9 degrees Diagnosis Normal sinus rhythm Left axis deviation Septal infarct (cited on or before 06-AUG-2009) Abnormal ECG When compared with ECG of 11-AUG-2015 09:20, No significant change was found Confirmed by Gloria Alvarenga M.D., John Piña ([id number]) on 9/12/2018 2:55:58 PM 
  
[address] [place] [address] [place] Collection Time: 09/12/18  2:24 PM  
Result Value Ref Range SAMPLES BEING HELD 1RED, 1BLU   
 COMMENT Add-on orders for these samples will be processed based on acceptable specimen integrity and analyte stability, which may vary by analyte. CBC WITH AUTOMATED DIFF Collection Time: 09/12/18  2:24 PM  
Result Value Ref Range WBC 10.8 3.6 - 11.0 K/uL  
 RBC 4.52 3.80 - 5.20 M/uL  
 HGB 11.1 (L) 11.5 - 16.0 g/dL HCT 37.1 35.0 - 47.0 % MCV 82.1 80.0 - 99.0 FL  
 MCH 24.6 (L) 26.0 - 34.0 PG  
 MCHC 29.9 (L) 30.0 - 36.5 g/dL  
 RDW 14.3 11.5 - 14.5 % PLATELET 073 514 - 441 K/uL MPV 9.5 8.9 - 12.9 FL  
 NRBC 0.0 0  WBC ABSOLUTE NRBC 0.00 0.00 - 0.01 K/uL NEUTROPHILS 59 32 - 75 % LYMPHOCYTES 28 12 - 49 % MONOCYTES 5 5 - 13 % EOSINOPHILS 7 0 - 7 % BASOPHILS 1 0 - 1 % IMMATURE GRANULOCYTES 0 0.0 - 0.5 % ABS. NEUTROPHILS 6.4 1.8 - 8.0 K/UL  
 ABS. LYMPHOCYTES 3.1 0.8 - 3.5 K/UL  
 ABS. MONOCYTES 0.5 0.0 - 1.0 K/UL  
 ABS. EOSINOPHILS 0.8 (H) 0.0 - 0.4 K/UL  
 ABS. BASOPHILS 0.1 0.0 - 0.1 K/UL  
 ABS. IMM. GRANS. 0.0 0.00 - 0.04 K/UL  
 DF AUTOMATED METABOLIC PANEL, COMPREHENSIVE Collection Time: 09/12/18  2:24 PM  
Result Value Ref Range Sodium 141 136 - 145 mmol/L Potassium 4.3 3.5 - 5.1 mmol/L Chloride 103 97 - 108 mmol/L  
 CO2 30 21 - 32 mmol/L Anion gap 8 5 - 15 mmol/L Glucose 196 (H) 65 - 100 mg/dL BUN 9 6 - 20 MG/DL  Creatinine 0.79 0.55 - 1.02 MG/DL  
 BUN/Creatinine ratio 11 (L) 12 - 20    
 GFR est AA >60 >60 ml/min/1.73m2 GFR est non-AA >60 >60 ml/min/1.73m2 Calcium 9.6 8.5 - 10.1 MG/DL Bilirubin, total 0.2 0.2 - 1.0 MG/DL  
 ALT (SGPT) 23 12 - 78 U/L  
 AST (SGOT) 22 15 - 37 U/L Alk. phosphatase 168 (H) 45 - 117 U/L Protein, total 7.9 6.4 - 8.2 g/dL Albumin 3.3 (L) 3.5 - 5.0 g/dL Globulin 4.6 (H) 2.0 - 4.0 g/dL A-G Ratio 0.7 (L) 1.1 - 2.2    
TROPONIN I Collection Time: 09/12/18  2:24 PM  
Result Value Ref Range Troponin-I, Qt. <0.05 <0.05 ng/mL URINALYSIS W/MICROSCOPIC Collection Time: 09/12/18  4:04 PM  
Result Value Ref Range Color YELLOW/STRAW Appearance CLOUDY (A) CLEAR Specific gravity 1.014 1.003 - 1.030    
 pH (UA) 7.0 5.0 - 8.0 Protein NEGATIVE  NEG mg/dL Glucose NEGATIVE  NEG mg/dL Ketone NEGATIVE  NEG mg/dL Bilirubin NEGATIVE  NEG Blood NEGATIVE  NEG Urobilinogen 1.0 0.2 - 1.0 EU/dL Nitrites NEGATIVE  NEG Leukocyte Esterase NEGATIVE  NEG    
 WBC 0-4 0 - 4 /hpf  
 RBC 0-5 0 - 5 /hpf Epithelial cells FEW FEW /lpf Bacteria NEGATIVE  NEG /hpf Hyaline cast 0-2 0 - 5 /lpf URINE CULTURE HOLD SAMPLE Collection Time: 09/12/18  4:04 PM  
Result Value Ref Range Urine culture hold URINE ON HOLD IN MICROBIOLOGY DEPT FOR 3 DAYS. IF UNPRESERVED URINE IS SUBMITTED, IT CANNOT BE USED FOR ADDITIONAL TESTING AFTER 24 HRS, RECOLLECTION WILL BE REQUIRED. Radiology Results The following have been ordered and reviewed: 
Ct Head Wo Cont Result Date: 9/12/2018 EXAM:  CT HEAD WO CONT INDICATION: Right-sided  headache; HTN. Chest tightness and palpitations. COMPARISON: None. CONTRAST:  None. TECHNIQUE: Unenhanced CT of the head was performed using 5 mm images. Brain and bone windows were generated. CT dose reduction was achieved through use of a standardized protocol tailored for this examination and automatic exposure control for dose modulation.   FINDINGS: The ventricles and sulci are normal in size, shape and configuration and midline. Mild low density is noted in the basal ganglia regions, left greater than right, accompanied by coarse calcification which represents a variant of normal. There is no intracranial hemorrhage, extra-axial collection, mass, mass effect or midline shift. The basilar cisterns are open. No acute infarct is identified. The bone windows demonstrate no abnormalities. The visualized portions of the paranasal sinuses and mastoid air cells are clear. IMPRESSION: 1. No acute intracranial abnormality is confirmed. 2. Microvascular ischemic and age-related change in the basal ganglia regions, left greater than right. Medications Given Medications  
acetaminophen (TYLENOL) tablet 1,000 mg (1,000 mg Oral Given 9/12/18 9368) Plan Follow-up Information Follow up With Details Comments Contact Info Shaquille Porter MD Schedule an appointment as soon as possible for a visit As needed, for re-evaluation [address] [place] [address] 
[phone] [facility] EMERGENCY DEPT Go to If symptoms worsen [address] [address] [facility] 
[phone] Discharge Medication List as of 9/12/2018  6:22 PM

## 2018-09-12 NOTE — ED NOTES
Bedside and Verbal shift change report given to Sarina Sun (oncoming nurse) by Simon Eldridge (offgoing nurse). Report included the following information SBAR, ED Summary, MAR, Recent Results and Cardiac Rhythm NSR.

## 2019-04-23 ENCOUNTER — HOSPITAL ENCOUNTER (OUTPATIENT)
Dept: NUCLEAR MEDICINE | Age: 69
Discharge: HOME | End: 2019-04-23
Attending: ORTHOPAEDIC SURGERY
Payer: MEDICARE

## 2019-04-23 DIAGNOSIS — M25.551 RIGHT HIP PAIN: ICD-10-CM

## 2019-04-23 DIAGNOSIS — Z96.641 STATUS POST RIGHT HIP REPLACEMENT: ICD-10-CM

## 2019-04-23 PROCEDURE — 78315 BONE IMAGING 3 PHASE: CPT

## 2019-04-30 ENCOUNTER — HOSPITAL ENCOUNTER (OUTPATIENT)
Dept: GENERAL RADIOLOGY | Age: 69
Discharge: HOME | End: 2019-04-30
Attending: ORTHOPAEDIC SURGERY
Payer: MEDICARE

## 2019-04-30 DIAGNOSIS — T84.7XXD INFECTION AND INFLAMMATORY REACTION DUE TO INTERNAL ORTHOPEDIC DEVICE, IMPLANT, AND GRAFT, SUBSEQUENT ENCOUNTER: ICD-10-CM

## 2019-04-30 LAB
COMMENT, HOLDF: NORMAL
SAMPLES BEING HELD,HOLD: NORMAL

## 2019-04-30 PROCEDURE — 87077 CULTURE AEROBIC IDENTIFY: CPT

## 2019-04-30 PROCEDURE — 74011250636 HC RX REV CODE- 250/636: Performed by: RADIOLOGY

## 2019-04-30 PROCEDURE — 87205 SMEAR GRAM STAIN: CPT

## 2019-04-30 PROCEDURE — 87186 SC STD MICRODIL/AGAR DIL: CPT

## 2019-04-30 PROCEDURE — 20610 DRAIN/INJ JOINT/BURSA W/O US: CPT

## 2019-04-30 RX ORDER — LIDOCAINE HYDROCHLORIDE 10 MG/ML
10 INJECTION, SOLUTION EPIDURAL; INFILTRATION; INTRACAUDAL; PERINEURAL
Status: COMPLETED | OUTPATIENT
Start: 2019-04-30 | End: 2019-04-30

## 2019-04-30 RX ADMIN — LIDOCAINE HYDROCHLORIDE 5 ML: 10 INJECTION, SOLUTION EPIDURAL; INFILTRATION; INTRACAUDAL; PERINEURAL at 14:41

## 2019-05-06 ENCOUNTER — HOSPITAL ENCOUNTER (OUTPATIENT)
Dept: CT IMAGING | Age: 69
Discharge: HOME | End: 2019-05-06
Attending: ORTHOPAEDIC SURGERY
Payer: MEDICARE

## 2019-05-06 DIAGNOSIS — Z96.649 MECHANICAL LOOSENING OF PROSTHETIC HIP, INITIAL ENCOUNTER (HCC): ICD-10-CM

## 2019-05-06 DIAGNOSIS — T84.59XA PROSTHETIC HIP INFECTION, INITIAL ENCOUNTER (HCC): ICD-10-CM

## 2019-05-06 DIAGNOSIS — T84.038A MECHANICAL LOOSENING OF PROSTHETIC HIP, INITIAL ENCOUNTER (HCC): ICD-10-CM

## 2019-05-06 DIAGNOSIS — M96.661: ICD-10-CM

## 2019-05-06 DIAGNOSIS — Z96.649 PROSTHETIC HIP INFECTION, INITIAL ENCOUNTER (HCC): ICD-10-CM

## 2019-05-06 PROCEDURE — 73700 CT LOWER EXTREMITY W/O DYE: CPT

## 2019-05-14 LAB
BACTERIA SPEC CULT: ABNORMAL
GRAM STN SPEC: ABNORMAL
GRAM STN SPEC: ABNORMAL
SERVICE CMNT-IMP: ABNORMAL